# Patient Record
Sex: MALE | Race: WHITE
[De-identification: names, ages, dates, MRNs, and addresses within clinical notes are randomized per-mention and may not be internally consistent; named-entity substitution may affect disease eponyms.]

---

## 2019-10-14 ENCOUNTER — HOSPITAL ENCOUNTER (OUTPATIENT)
Dept: HOSPITAL 97 - OR | Age: 19
Discharge: HOME | End: 2019-10-14
Attending: INTERNAL MEDICINE
Payer: COMMERCIAL

## 2019-10-14 VITALS — SYSTOLIC BLOOD PRESSURE: 113 MMHG | TEMPERATURE: 97.2 F | DIASTOLIC BLOOD PRESSURE: 67 MMHG | OXYGEN SATURATION: 99 %

## 2019-10-14 DIAGNOSIS — K44.9: ICD-10-CM

## 2019-10-14 DIAGNOSIS — K21.0: Primary | ICD-10-CM

## 2019-10-14 DIAGNOSIS — I10: ICD-10-CM

## 2019-10-14 DIAGNOSIS — Z80.42: ICD-10-CM

## 2019-10-14 DIAGNOSIS — Z82.49: ICD-10-CM

## 2019-10-14 DIAGNOSIS — K29.80: ICD-10-CM

## 2019-10-14 DIAGNOSIS — K29.50: ICD-10-CM

## 2019-10-14 DIAGNOSIS — F17.220: ICD-10-CM

## 2019-10-14 PROCEDURE — 88312 SPECIAL STAINS GROUP 1: CPT

## 2019-10-14 PROCEDURE — 0DB28ZX EXCISION OF MIDDLE ESOPHAGUS, VIA NATURAL OR ARTIFICIAL OPENING ENDOSCOPIC, DIAGNOSTIC: ICD-10-PCS

## 2019-10-14 PROCEDURE — 0DB38ZX EXCISION OF LOWER ESOPHAGUS, VIA NATURAL OR ARTIFICIAL OPENING ENDOSCOPIC, DIAGNOSTIC: ICD-10-PCS

## 2019-10-14 PROCEDURE — 43239 EGD BIOPSY SINGLE/MULTIPLE: CPT

## 2019-10-14 PROCEDURE — 0DB68ZX EXCISION OF STOMACH, VIA NATURAL OR ARTIFICIAL OPENING ENDOSCOPIC, DIAGNOSTIC: ICD-10-PCS

## 2019-10-14 PROCEDURE — 0DB88ZX EXCISION OF SMALL INTESTINE, VIA NATURAL OR ARTIFICIAL OPENING ENDOSCOPIC, DIAGNOSTIC: ICD-10-PCS

## 2019-10-14 PROCEDURE — 88305 TISSUE EXAM BY PATHOLOGIST: CPT

## 2019-10-14 NOTE — OP
Surgeon:  Madan Harris MD



Procedure To Be Performed:  Esophagogastroduodenoscopy.



Performing Physician:  Madan Harris MD.



Indications For Procedure:  Epigastric pain, nausea, change in bowel habits, GERD.



Plan For Anesthesia:  Monitored anesthesia care.



Complexity:  Average.



Technique:  After obtaining informed consent from the patient and explaining risks and complications 
which include, but are not limited to bleeding, infection, perforation, and anesthesia complication, 
patient was placed in the left lateral position and sedation was given.  From then on the scope was a
dvanced through the mouth and carefully guided up until the 3rd portion of the duodenum.  After the c
ompletion of examination, scope and equipment were withdrawn and procedure terminated in a safe srinivas
r.



Findings:  Esophagus.  In the entire esophagus mild granularity was seen with some evidence of trache
alization.  Multiple biopsies taken from the mid esophagus.  In the distal esophagus, there was evide
nce of LA grade B esophagitis.  Biopsies taken.  A small hiatal hernia was seen.  Stomach; mild patch
y erythema seen in the body and antrum, biopsies taken.  Duodenum; mild duodenitis seen in the bulb. 
 The second and third portion appeared normal.  Small bowel biopsies taken to rule out celiac disease
.



Complications:  None.



Tolerance To Anesthesia:  Excellent.



Postoperative Diagnoses:  Esophagitis, hiatal hernia, gastritis, duodenitis.



Plan:  

1.Await pathology results.

2.Follow up in the clinic.

3.Oral PPI like Protonix once a day.

4.Ultrasound of the abdomen if not done.

5.Colonoscopy due to lower GI symptoms.





US/MODL

DD:  10/14/2019 09:48:35Voice ID:  [id number]

DT:  10/14/2019 19:59:14Report ID:  [id number]

## 2019-12-19 ENCOUNTER — HOSPITAL ENCOUNTER (OUTPATIENT)
Dept: HOSPITAL 97 - OR | Age: 19
Discharge: HOME | End: 2019-12-19
Attending: INTERNAL MEDICINE
Payer: COMMERCIAL

## 2019-12-19 VITALS — DIASTOLIC BLOOD PRESSURE: 74 MMHG | TEMPERATURE: 98 F | SYSTOLIC BLOOD PRESSURE: 115 MMHG

## 2019-12-19 VITALS — OXYGEN SATURATION: 98 %

## 2019-12-19 DIAGNOSIS — R10.9: ICD-10-CM

## 2019-12-19 DIAGNOSIS — R63.0: ICD-10-CM

## 2019-12-19 DIAGNOSIS — K92.1: ICD-10-CM

## 2019-12-19 DIAGNOSIS — K64.8: ICD-10-CM

## 2019-12-19 DIAGNOSIS — R19.4: ICD-10-CM

## 2019-12-19 DIAGNOSIS — I10: ICD-10-CM

## 2019-12-19 DIAGNOSIS — K21.0: ICD-10-CM

## 2019-12-19 DIAGNOSIS — R19.7: Primary | ICD-10-CM

## 2019-12-19 PROCEDURE — 45380 COLONOSCOPY AND BIOPSY: CPT

## 2019-12-19 PROCEDURE — 88305 TISSUE EXAM BY PATHOLOGIST: CPT

## 2019-12-19 PROCEDURE — 0DBE8ZX EXCISION OF LARGE INTESTINE, VIA NATURAL OR ARTIFICIAL OPENING ENDOSCOPIC, DIAGNOSTIC: ICD-10-PCS

## 2019-12-19 NOTE — OP
Surgeon:  Madan Harris MD



Procedure Performed:  Colonoscopy.



Indication For Procedure:  Chronic diarrhea, abdominal pain.



Plan For Anesthesia:  Monitored anesthesia care.



Complexity:  Average.



Technique:  After obtaining informed consent from the patient and explaining risks and complications 
which include, but are not limited to bleeding, infection, perforation, and anesthesia complications,
 the patient was placed in the left lateral position and sedation was given.  From then on, a digital
 rectal exam was performed.  Scope inserted into the rectum and carefully guided up till the terminal
 ileum, then gradually withdrawn while carefully examining the colonic mucosa.  The cecum was identif
ied by the appendiceal orifice.  The ileocecal valve and terminal ileum.  Quality of prep was good.  
Scope withdrawal time was 9 minutes.



Findings:  From the rectum to the cecum, the colonic mucosa appeared normal without any evidence of u
lceration or inflammation.  Random biopsies were taken from all segments in a single bottle to rule o
ut microscopic colitis.  The terminal ileum appeared normal.  Retroflexion revealed grade 1 internal 
hemorrhoids.



Complications:  None.



Tolerance To Anesthesia:  Excellent.



Postoperative Diagnosis:  Normal colonoscopy.



Plan:  

1.Await pathology results.

2.Follow up in the GI clinic in 2 weeks.  

3.Proceed with small bowel follow-through and capsule endoscopy as 

the last step to rule out Crohn's.  Otherwise, the diagnosis would favor IBS.





US/MODL

DD:  12/19/2019 11:26:41Voice ID:  [id number]

DT:  12/19/2019 22:25:32Report ID:  [id number]

## 2019-12-19 NOTE — XMS REPORT
[facility]

 Created on:September 3, 2019



Patient:Juan Munoz

Sex:Male

:2000

External Reference #:[id number]





Demographics







 Address  [address] [place] [address] 



   [place] [address]

 

 Phone  Unavailable

 

 Preferred Language  en

 

 Marital Status  Unknown

 

 Confucianism Affiliation  Unknown

 

 Race  Unreported/Refused to Report

 

 Ethnic Group  Unknown









Author







 Organization  [employer]









Care Team Providers







 Name  Role  Phone

 

 [facility], [facility]  Provider Role  Unavailable









Allergies, Adverse Reactions, Alerts







 Substance  Reaction  Event Type

 

 N.K.D.A.  Info Not Available  Non Drug Allergy







Problems







 Problem Type  Condition  Code  Onset Dates  Condition Status

 

 Problem  Hypertriglyceridemia  E78.1    Active

 

 Problem  Blood pressure elevated without  R03.0    Active



   history of HTN      

 

 Problem  Chewing tobacco use  Z72.0    Active

 

 Problem  Insomnia, unspecified type  G47.00    Active

 

 Problem  Constipation, unspecified  K59.00    Active



   constipation type      

 

 Problem  Daytime somnolence  R40.0    Active

 

 Problem  Anxiety  F41.9    Active

 

 Problem  Stress  F43.9    Active

 

 Problem  Adult BMI 30.0-30.9 kg/sq m  Z68.30    Active

 

 Problem  HTN, goal below 140/90  I10    Active

 

 Assessment  Anxiety  F41.9    Active

 

 Assessment  Insomnia, unspecified type  G47.00    Active

 

 Assessment  Adult BMI 30.0-30.9 kg/sq m  Z68.30    Active

 

 Assessment  Generalized abdominal pain  R10.84    Active

 

 Assessment  HTN, goal below 140/90  I10    Active

 

 Assessment  BRBPR (bright red blood per rectum)  K62.5    Active

 

 Assessment  Constipation, unspecified  K59.00    Active



   constipation type      

 

 Problem  Skin infection  L08.9    Active







Medications







 Medication  Code System  Code  Instructions  Start  End Date  Status  Dosage



         Date      

 

 Lisinopril  NDC  12852300563  10 MG Orally Once      Active  1 tablet



       a day        

 

 ProAir HFA  NDC  32389209093  108 (90 Base)  ,    Active  2 puffs as



       MCG/ACT  2019      needed



       Inhalation every        



       6 hrs PRN        



       SHortness of        



       breath, COugh and        



       Wheezing        

 

 Lisinopril  NDC  [id number]  10 MG Orally Once      Active  1 tablet



       a day        







Results

No Known Results



Summary Purpose

[employer] Submission